# Patient Record
Sex: MALE | Race: BLACK OR AFRICAN AMERICAN | Employment: UNEMPLOYED | URBAN - METROPOLITAN AREA
[De-identification: names, ages, dates, MRNs, and addresses within clinical notes are randomized per-mention and may not be internally consistent; named-entity substitution may affect disease eponyms.]

---

## 2017-09-28 ENCOUNTER — HOSPITAL ENCOUNTER (EMERGENCY)
Age: 57
Discharge: HOME | End: 2017-09-28
Attending: EMERGENCY MEDICINE
Payer: SELF-PAY

## 2017-09-28 ENCOUNTER — APPOINTMENT (OUTPATIENT)
Dept: CT IMAGING | Age: 57
End: 2017-09-28
Attending: EMERGENCY MEDICINE
Payer: SELF-PAY

## 2017-09-28 ENCOUNTER — APPOINTMENT (OUTPATIENT)
Dept: GENERAL RADIOLOGY | Age: 57
End: 2017-09-28
Attending: EMERGENCY MEDICINE
Payer: SELF-PAY

## 2017-09-28 VITALS
SYSTOLIC BLOOD PRESSURE: 185 MMHG | RESPIRATION RATE: 18 BRPM | BODY MASS INDEX: 38.57 KG/M2 | HEIGHT: 66 IN | OXYGEN SATURATION: 94 % | WEIGHT: 240 LBS | TEMPERATURE: 98 F | HEART RATE: 92 BPM | DIASTOLIC BLOOD PRESSURE: 87 MMHG

## 2017-09-28 DIAGNOSIS — S80.02XA CONTUSION OF LEFT KNEE, INITIAL ENCOUNTER: Primary | ICD-10-CM
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
## 2017-09-28 LAB
ALBUMIN SERPL-MCNC: 3.7 G/DL (ref 3.5–5)
ALBUMIN/GLOB SERPL: 0.8 {RATIO} (ref 1.2–3.5)
ALP SERPL-CCNC: 129 U/L (ref 50–136)
ALT SERPL-CCNC: 54 U/L (ref 12–65)
ANION GAP SERPL CALC-SCNC: 7 MMOL/L (ref 7–16)
AST SERPL-CCNC: 28 U/L (ref 15–37)
BASOPHILS # BLD: 0 K/UL (ref 0–0.2)
BASOPHILS NFR BLD: 0 % (ref 0–2)
BILIRUB SERPL-MCNC: 0.4 MG/DL (ref 0.2–1.1)
BUN SERPL-MCNC: 19 MG/DL (ref 6–23)
CALCIUM SERPL-MCNC: 8.7 MG/DL (ref 8.3–10.4)
CHLORIDE SERPL-SCNC: 105 MMOL/L (ref 98–107)
CO2 SERPL-SCNC: 28 MMOL/L (ref 21–32)
CREAT SERPL-MCNC: 1.49 MG/DL (ref 0.8–1.5)
DIFFERENTIAL METHOD BLD: ABNORMAL
EOSINOPHIL # BLD: 0.1 K/UL (ref 0–0.8)
EOSINOPHIL NFR BLD: 1 % (ref 0.5–7.8)
ERYTHROCYTE [DISTWIDTH] IN BLOOD BY AUTOMATED COUNT: 15.2 % (ref 11.9–14.6)
GLOBULIN SER CALC-MCNC: 4.4 G/DL (ref 2.3–3.5)
GLUCOSE SERPL-MCNC: 114 MG/DL (ref 65–100)
HCT VFR BLD AUTO: 43 % (ref 41.1–50.3)
HGB BLD-MCNC: 15.2 G/DL (ref 13.6–17.2)
IMM GRANULOCYTES # BLD: 0 K/UL (ref 0–0.5)
IMM GRANULOCYTES NFR BLD: 0.1 % (ref 0–5)
LYMPHOCYTES # BLD: 2 K/UL (ref 0.5–4.6)
LYMPHOCYTES NFR BLD: 23 % (ref 13–44)
MCH RBC QN AUTO: 28.5 PG (ref 26.1–32.9)
MCHC RBC AUTO-ENTMCNC: 35.3 G/DL (ref 31.4–35)
MCV RBC AUTO: 80.7 FL (ref 79.6–97.8)
MONOCYTES # BLD: 0.7 K/UL (ref 0.1–1.3)
MONOCYTES NFR BLD: 8 % (ref 4–12)
NEUTS SEG # BLD: 6.1 K/UL (ref 1.7–8.2)
NEUTS SEG NFR BLD: 68 % (ref 43–78)
PLATELET # BLD AUTO: 229 K/UL (ref 150–450)
PMV BLD AUTO: 10.2 FL (ref 10.8–14.1)
POTASSIUM SERPL-SCNC: 4.1 MMOL/L (ref 3.5–5.1)
PROT SERPL-MCNC: 8.1 G/DL (ref 6.3–8.2)
RBC # BLD AUTO: 5.33 M/UL (ref 4.23–5.67)
SODIUM SERPL-SCNC: 140 MMOL/L (ref 136–145)
WBC # BLD AUTO: 9 K/UL (ref 4.3–11.1)

## 2017-09-28 PROCEDURE — 80053 COMPREHEN METABOLIC PANEL: CPT | Performed by: EMERGENCY MEDICINE

## 2017-09-28 PROCEDURE — 73562 X-RAY EXAM OF KNEE 3: CPT

## 2017-09-28 PROCEDURE — 99283 EMERGENCY DEPT VISIT LOW MDM: CPT | Performed by: EMERGENCY MEDICINE

## 2017-09-28 PROCEDURE — 85025 COMPLETE CBC W/AUTO DIFF WBC: CPT | Performed by: EMERGENCY MEDICINE

## 2017-09-28 PROCEDURE — 70450 CT HEAD/BRAIN W/O DYE: CPT

## 2017-09-28 RX ORDER — NAPROXEN SODIUM 550 MG/1
550 TABLET ORAL 2 TIMES DAILY WITH MEALS
Qty: 10 TAB | Refills: 0 | Status: SHIPPED | OUTPATIENT
Start: 2017-09-28

## 2017-09-28 RX ORDER — TRAMADOL HYDROCHLORIDE 50 MG/1
50 TABLET ORAL
Qty: 15 TAB | Refills: 0 | Status: SHIPPED | OUTPATIENT
Start: 2017-09-28 | End: 2017-09-28

## 2017-09-28 NOTE — ED NOTES
I have reviewed discharge instructions with the patient. The patient verbalized understanding. Patient left ED via Discharge Method: ambulatory to Home with friend. Opportunity for questions and clarification provided. Patient given 1 scripts.

## 2017-09-28 NOTE — DISCHARGE INSTRUCTIONS

## 2017-09-28 NOTE — ED TRIAGE NOTES
Pt arrives to this facility via EMS r/t a fall that occurred behind [employer] this evening. Pt reports hitting his head, unsure of weather or not he lost consciousness. Pt also reports pain to his left knee. Pt alert and oriented x 4.

## 2017-09-28 NOTE — ED PROVIDER NOTES
HPI Comments: Patient is a 20-year-old male who presents with fall approximately 1.5 hours ago. States he was waiting for [employer] open and he missed a curb. Complaining of pain primarily to his left knee. Also mentions he hit his head. States he didn't hit his head very hard. No loss of consciousness. No palpable hematoma. Patient states that he was at [facility] on 4/28/17 and was admitted for an epidural hematoma after a fall. Patient has discharge paperwork in regards this. Patient is ambulatory though with some pain. Alert and oriented at this time. Patient is a 62 y.o. male presenting with fall. The history is provided by the patient. No  was used. Fall   Pertinent negatives include no fever, no abdominal pain, no nausea, no vomiting and no headaches. Past Medical History:   Diagnosis Date    Hypertension        No past surgical history on file. No family history on file. Social History     Social History    Marital status: SINGLE     Spouse name: N/A    Number of children: N/A    Years of education: N/A     Occupational History    Not on file. Social History Main Topics    Smoking status: Not on file    Smokeless tobacco: Not on file    Alcohol use Not on file    Drug use: Not on file    Sexual activity: Not on file     Other Topics Concern    Not on file     Social History Narrative    No narrative on file         ALLERGIES: Review of patient's allergies indicates no known allergies. Review of Systems   Constitutional: Negative for fatigue and fever. Respiratory: Negative for cough and shortness of breath. Gastrointestinal: Negative for abdominal pain, nausea and vomiting. Genitourinary: Negative for difficulty urinating. Musculoskeletal: Negative for back pain and neck pain. Left knee pain   Skin: Negative for rash. Neurological: Negative for headaches. Psychiatric/Behavioral: Negative for confusion.  The patient is not nervous/anxious. Vitals:    09/28/17 0637   BP: (!) 153/95   Pulse: 95   Resp: 18   Temp: 98 °F (36.7 °C)   SpO2: 95%   Weight: 108.9 kg (240 lb)   Height: 5' 6\" (1.676 m)            Physical Exam   Constitutional: He is oriented to person, place, and time. He appears well-developed and well-nourished. No distress. HENT:   Head: Normocephalic and atraumatic. Eyes: Conjunctivae and EOM are normal. Pupils are equal, round, and reactive to light. Neck: Normal range of motion. Neck supple. Cardiovascular: Normal rate, regular rhythm and normal heart sounds. Pulmonary/Chest: Effort normal and breath sounds normal. No respiratory distress. He has no wheezes. He has no rales. Abdominal: Soft. He exhibits no distension. There is no tenderness. There is no rebound. Musculoskeletal: Normal range of motion. He exhibits tenderness. He exhibits no edema. Patient is point tender in his left lateral tibial plateau region. There is a small abrasion overlying this region. No significant knee effusion appreciated. No laxity appreciated. Neurological: He is alert and oriented to person, place, and time. Skin: Skin is warm and dry. No rash noted. He is not diaphoretic. Psychiatric: He has a normal mood and affect. His behavior is normal.   Vitals reviewed. MDM  Number of Diagnoses or Management Options  Contusion of left knee, initial encounter: new and requires workup  Fall, initial encounter: new and requires workup  Diagnosis management comments: Patient appears quite well and sleeping in bed. X-rays as well as CT scan negative. Labs are reassuring. We'll discharged home and have her follow up with her primary care plan for future.        Amount and/or Complexity of Data Reviewed  Clinical lab tests: ordered and reviewed (Results for orders placed or performed during the hospital encounter of 09/28/17  -CBC WITH AUTOMATED DIFF       Result                                            Value Ref Range                       WBC                                               9.0                           4.3 - 11.1 K/uL                 RBC                                               5.33                          4.23 - 5.67 M/uL                HGB                                               15.2                          13.6 - 17.2 g/dL                HCT                                               43.0                          41.1 - 50.3 %                   MCV                                               80.7                          79.6 - 97.8 FL                  MCH                                               28.5                          26.1 - 32.9 PG                  MCHC                                              35.3 (H)                      31.4 - 35.0 g/dL                RDW                                               15.2 (H)                      11.9 - 14.6 %                   PLATELET                                          229                           150 - 450 K/uL                  MPV                                               10.2 (L)                      10.8 - 14.1 FL                  DF                                                AUTOMATED                                                     NEUTROPHILS                                       68                            43 - 78 %                       LYMPHOCYTES                                       23                            13 - 44 %                       MONOCYTES                                         8                             4.0 - 12.0 %                    EOSINOPHILS                                       1                             0.5 - 7.8 %                     BASOPHILS                                         0                             0.0 - 2.0 %                     IMMATURE GRANULOCYTES                             0.1                           0.0 - 5.0 % ABS. NEUTROPHILS                                  6.1                           1.7 - 8.2 K/UL                  ABS. LYMPHOCYTES                                  2.0                           0.5 - 4.6 K/UL                  ABS. MONOCYTES                                    0.7                           0.1 - 1.3 K/UL                  ABS. EOSINOPHILS                                  0.1                           0.0 - 0.8 K/UL                  ABS. BASOPHILS                                    0.0                           0.0 - 0.2 K/UL                  ABS. IMM.  GRANS.                                  0.0                           0.0 - 0.5 K/UL             -METABOLIC PANEL, COMPREHENSIVE       Result                                            Value                         Ref Range                       Sodium                                            140                           136 - 145 mmol/L                Potassium                                         4.1                           3.5 - 5.1 mmol/L                Chloride                                          105                           98 - 107 mmol/L                 CO2                                               28                            21 - 32 mmol/L                  Anion gap                                         7                             7 - 16 mmol/L                   Glucose                                           114 (H)                       65 - 100 mg/dL                  BUN                                               19                            6 - 23 MG/DL                    Creatinine                                        1.49                          0.8 - 1.5 MG/DL                 GFR est AA                                        >60                           >60 ml/min/1.73m2               GFR est non-AA                                    52 (L)                        >60 ml/min/1.73m2 Calcium                                           8.7                           8.3 - 10.4 MG/DL                Bilirubin, total                                  0.4                           0.2 - 1.1 MG/DL                 ALT (SGPT)                                        54                            12 - 65 U/L                     AST (SGOT)                                        28                            15 - 37 U/L                     Alk. phosphatase                                  129                           50 - 136 U/L                    Protein, total                                    8.1                           6.3 - 8.2 g/dL                  Albumin                                           3.7                           3.5 - 5.0 g/dL                  Globulin                                          4.4 (H)                       2.3 - 3.5 g/dL                  A-G Ratio                                         0.8 (L)                       1.2 - 3.5                  )  Tests in the radiology section of CPT®: ordered and reviewed (Ct Head Wo Cont    Result Date: 9/28/2017  CT HEAD WITHOUT CONTRAST. INDICATION: Head injury sustained in fall. COMPARISON: None. TECHNIQUE:   5 mm axial scans from the skull base to the vertex. Our CT scanners use one or more of the following:  Automated exposure control, adjustment of the mA and or kV according to patient size, iterative reconstruction. FINDINGS:  No acute intraparenchymal hemorrhage or abnormal extra-axial fluid collection. The ventricles are normal size. No midline shift or mass effect. Included portion of the paranasal sinuses and orbits grossly unremarkable. IMPRESSION:  Negative for acute intracranial abnormality. Xr Knee Lt 3 V    Result Date: 9/28/2017  LEFT KNEE 3 views. HISTORY: Acute left knee pain following fall TECHNIQUE: AP and lateral and oblique views. COMPARISON: None.  FINDINGS: No acute fracture, dislocation or subluxation. Mild degenerative change.      IMPRESSION: Negative for acute fracture.     )  Review and summarize past medical records: yes  Independent visualization of images, tracings, or specimens: yes    Risk of Complications, Morbidity, and/or Mortality  Presenting problems: moderate  Diagnostic procedures: moderate  Management options: moderate    Patient Progress  Patient progress: improved    ED Course       Procedures

## 2024-05-08 ENCOUNTER — HOSPITAL ENCOUNTER (EMERGENCY)
Age: 64
Discharge: HOME | End: 2024-05-08
Attending: EMERGENCY MEDICINE
Payer: MEDICARE

## 2024-05-08 VITALS
HEART RATE: 76 BPM | OXYGEN SATURATION: 96 % | BODY MASS INDEX: 48.21 KG/M2 | WEIGHT: 300 LBS | SYSTOLIC BLOOD PRESSURE: 158 MMHG | DIASTOLIC BLOOD PRESSURE: 95 MMHG | TEMPERATURE: 98.5 F | RESPIRATION RATE: 16 BRPM | HEIGHT: 66 IN

## 2024-05-08 DIAGNOSIS — F14.20 COCAINE DEPENDENCE WITHOUT COMPLICATION (HCC): Primary | ICD-10-CM

## 2024-05-08 LAB
ALBUMIN SERPL-MCNC: 3.8 G/DL (ref 3.2–4.6)
ALBUMIN/GLOB SERPL: 1 (ref 1–1.9)
ALP SERPL-CCNC: 116 U/L (ref 40–129)
ALT SERPL-CCNC: 28 U/L (ref 12–65)
AMPHET UR QL SCN: NEGATIVE
ANION GAP SERPL CALC-SCNC: 11 MMOL/L (ref 9–18)
AST SERPL-CCNC: 32 U/L (ref 15–37)
BARBITURATES UR QL SCN: NEGATIVE
BASOPHILS # BLD: 0.1 K/UL (ref 0–0.2)
BASOPHILS NFR BLD: 1 % (ref 0–2)
BENZODIAZ UR QL: NEGATIVE
BILIRUB SERPL-MCNC: 0.3 MG/DL (ref 0–1.2)
BUN SERPL-MCNC: 13 MG/DL (ref 8–23)
CALCIUM SERPL-MCNC: 10 MG/DL (ref 8.8–10.2)
CANNABINOIDS UR QL SCN: NEGATIVE
CHLORIDE SERPL-SCNC: 102 MMOL/L (ref 98–107)
CO2 SERPL-SCNC: 27 MMOL/L (ref 20–28)
COCAINE UR QL SCN: POSITIVE
CREAT SERPL-MCNC: 1.41 MG/DL (ref 0.8–1.3)
DIFFERENTIAL METHOD BLD: ABNORMAL
EOSINOPHIL # BLD: 0.1 K/UL (ref 0–0.8)
EOSINOPHIL NFR BLD: 2 % (ref 0.5–7.8)
ERYTHROCYTE [DISTWIDTH] IN BLOOD BY AUTOMATED COUNT: 15 % (ref 11.9–14.6)
GLOBULIN SER CALC-MCNC: 3.7 G/DL (ref 2.3–3.5)
GLUCOSE SERPL-MCNC: 142 MG/DL (ref 70–99)
HCT VFR BLD AUTO: 44.1 % (ref 41.1–50.3)
HGB BLD-MCNC: 14.9 G/DL (ref 13.6–17.2)
IMM GRANULOCYTES # BLD AUTO: 0 K/UL (ref 0–0.5)
IMM GRANULOCYTES NFR BLD AUTO: 0 % (ref 0–5)
LYMPHOCYTES # BLD: 1.9 K/UL (ref 0.5–4.6)
LYMPHOCYTES NFR BLD: 26 % (ref 13–44)
MCH RBC QN AUTO: 27.9 PG (ref 26.1–32.9)
MCHC RBC AUTO-ENTMCNC: 33.8 G/DL (ref 31.4–35)
MCV RBC AUTO: 82.6 FL (ref 82–102)
METHADONE UR QL: NEGATIVE
MONOCYTES # BLD: 0.7 K/UL (ref 0.1–1.3)
MONOCYTES NFR BLD: 10 % (ref 4–12)
NEUTS SEG # BLD: 4.5 K/UL (ref 1.7–8.2)
NEUTS SEG NFR BLD: 61 % (ref 43–78)
NRBC # BLD: 0 K/UL (ref 0–0.2)
OPIATES UR QL: NEGATIVE
PCP UR QL: NEGATIVE
PLATELET # BLD AUTO: 209 K/UL (ref 150–450)
PMV BLD AUTO: 11.1 FL (ref 9.4–12.3)
POTASSIUM SERPL-SCNC: 4.2 MMOL/L (ref 3.5–5.1)
PROT SERPL-MCNC: 7.4 G/DL (ref 6.3–8.2)
RBC # BLD AUTO: 5.34 M/UL (ref 4.23–5.6)
SODIUM SERPL-SCNC: 139 MMOL/L (ref 136–145)
WBC # BLD AUTO: 7.4 K/UL (ref 4.3–11.1)

## 2024-05-08 PROCEDURE — 6370000000 HC RX 637 (ALT 250 FOR IP): Performed by: EMERGENCY MEDICINE

## 2024-05-08 PROCEDURE — 85025 COMPLETE CBC W/AUTO DIFF WBC: CPT

## 2024-05-08 PROCEDURE — 80307 DRUG TEST PRSMV CHEM ANLYZR: CPT

## 2024-05-08 PROCEDURE — 99283 EMERGENCY DEPT VISIT LOW MDM: CPT

## 2024-05-08 PROCEDURE — 80053 COMPREHEN METABOLIC PANEL: CPT

## 2024-05-08 RX ORDER — ONDANSETRON 4 MG/1
4 TABLET, ORALLY DISINTEGRATING ORAL
Status: COMPLETED | OUTPATIENT
Start: 2024-05-08 | End: 2024-05-08

## 2024-05-08 RX ADMIN — ONDANSETRON 4 MG: 4 TABLET, ORALLY DISINTEGRATING ORAL at 13:37

## 2024-05-08 ASSESSMENT — ENCOUNTER SYMPTOMS
COLOR CHANGE: 0
BACK PAIN: 0
DIARRHEA: 0
COUGH: 0
ABDOMINAL PAIN: 0
RHINORRHEA: 0
VOMITING: 0
NAUSEA: 0
SHORTNESS OF BREATH: 0

## 2024-05-08 ASSESSMENT — LIFESTYLE VARIABLES
HOW OFTEN DO YOU HAVE A DRINK CONTAINING ALCOHOL: 2-4 TIMES A MONTH
HOW MANY STANDARD DRINKS CONTAINING ALCOHOL DO YOU HAVE ON A TYPICAL DAY: 3 OR 4

## 2024-05-08 ASSESSMENT — PAIN - FUNCTIONAL ASSESSMENT: PAIN_FUNCTIONAL_ASSESSMENT: NONE - DENIES PAIN

## 2024-05-08 NOTE — CARE COORDINATION
Patient admits to struggling with FREEMAN. Says he has been using Cocaine for a very long time and he wants to stop. He has been accepted to [facility] in [place] and CM is arranging transportation.

## 2024-05-08 NOTE — DISCHARGE INSTRUCTIONS
Call the number below for primary care referral.  Go to sober living facility.  Good luck and take good care of yourself    We would love to help you get a primary care doctor for follow-up after your emergency department visit.    Please call [phone] between 7AM - 6PM Monday to Friday.  A care navigator will be able to assist you with setting up a doctor close to your home.

## 2024-05-08 NOTE — ED PROVIDER NOTES
Emergency Department Provider Note       PCP: Unknown, Provider, APRN - NP   Age: 63 y.o.   Sex: male     DISPOSITION Decision To Discharge 05/08/2024 02:33:33 PM       ICD-10-CM    1. Cocaine dependence without complication (HCC)  F14.20           Medical Decision Making     Beni has seen the patient as well as case management and patient will be discharged from here to sober living.  Number to establish primary care provided as well.  Return precautions provided.  No acute medical issues today     1 chronic illness with exacerbation.  Shared medical decision making was utilized in creating the patients health plan today.    I independently ordered and reviewed each unique test.           The management of this patient was discussed with an external consultant.  FAVOR          History     63-year-old male presents requesting help with his chronic cocaine dependence.  He has been binging on cocaine in the last 6 days and drinking some alcohol but he states he does not drink on a regular basis.  He denies other drug use.  Patient denies chest pain trouble breathing heart palpitations, suicidal or homicidal ideation patient alert and oriented times          ROS     Review of Systems   Constitutional:  Negative for chills and fever.   HENT:  Negative for congestion and rhinorrhea.    Respiratory:  Negative for cough and shortness of breath.    Cardiovascular:  Negative for chest pain and leg swelling.   Gastrointestinal:  Negative for abdominal pain, diarrhea, nausea and vomiting.   Endocrine: Negative for polydipsia and polyuria.   Genitourinary:  Negative for dysuria, frequency and hematuria.   Musculoskeletal:  Negative for back pain and myalgias.   Skin:  Negative for color change and rash.   Neurological:  Negative for weakness and numbness.   Psychiatric/Behavioral:  Negative for suicidal ideas.    All other systems reviewed and are negative.       Physical Exam     Vitals signs and nursing note  reviewed:  Vitals:    05/08/24 1238 05/08/24 1258 05/08/24 1318 05/08/24 1345   BP: (!) 148/91 132/81 102/81 (!) 158/95   Pulse:   82 76   Resp:   18 16   Temp:       TempSrc:       SpO2: 97% 97% 99% 96%   Weight:       Height:          Physical Exam  Vitals and nursing note reviewed.   Constitutional:       Appearance: Normal appearance.   HENT:      Head: Normocephalic and atraumatic.      Nose: Nose normal.      Mouth/Throat:      Mouth: Mucous membranes are moist.   Eyes:      Conjunctiva/sclera: Conjunctivae normal.      Pupils: Pupils are equal, round, and reactive to light.   Cardiovascular:      Rate and Rhythm: Normal rate and regular rhythm.      Pulses: Normal pulses.      Heart sounds: Normal heart sounds.   Pulmonary:      Effort: Pulmonary effort is normal.      Breath sounds: Normal breath sounds.   Abdominal:      General: There is no distension.      Palpations: Abdomen is soft.      Tenderness: There is no abdominal tenderness. There is no guarding or rebound.   Musculoskeletal:         General: Normal range of motion.   Skin:     General: Skin is warm and dry.   Neurological:      Mental Status: He is alert and oriented to person, place, and time.   Psychiatric:         Behavior: Behavior normal.        Procedures     Procedures    Orders Placed This Encounter   Procedures    Urine Drug Screen    CBC with Auto Differential    Comprehensive Metabolic Panel    Misc nursing order (specify)        Medications given during this emergency department visit:  Medications   ondansetron (ZOFRAN-ODT) disintegrating tablet 4 mg (4 mg Oral Given 5/8/24 1337)       New Prescriptions    No medications on file        No past medical history on file.     No past surgical history on file.     Social History     Socioeconomic History    Marital status: Single        Previous Medications    No medications on file        Results for orders placed or performed during the hospital encounter of 05/08/24   Urine Drug

## 2024-05-08 NOTE — ED TRIAGE NOTES
Pt arrives from outside homeless shelter via EMS w/ cc of assistance w/ housing and rehab. Pt admits to cocaine use this morning. Pt denies SI, HI. VSS Pt A&O x4

## 2024-05-08 NOTE — ED NOTES
Patient mobility status  with no difficulty. Provider aware     I have reviewed discharge instructions with the patient.  The patient verbalized understanding.    Patient left ED via Discharge Method: ambulatory to [facility] with  [facility] .    Opportunity for questions and clarification provided.     Patient given 0 scripts.           Rajat Grimaldo, RN  05/08/24 2726

## 2024-05-08 NOTE — CARE COORDINATION
Patient discharge to [facility] in [place] located at [address] [place] [address].  Patient is provided transportation to [facility].

## 2024-07-08 ENCOUNTER — OFFICE VISIT (OUTPATIENT)
Dept: FAMILY MEDICINE CLINIC | Age: 64
End: 2024-07-08

## 2024-07-08 VITALS
BODY MASS INDEX: 37.2 KG/M2 | TEMPERATURE: 97 F | DIASTOLIC BLOOD PRESSURE: 89 MMHG | WEIGHT: 231.48 LBS | OXYGEN SATURATION: 98 % | HEIGHT: 66 IN | SYSTOLIC BLOOD PRESSURE: 139 MMHG | RESPIRATION RATE: 17 BRPM | HEART RATE: 89 BPM

## 2024-07-08 DIAGNOSIS — G89.29 CHRONIC LEFT SHOULDER PAIN: Primary | ICD-10-CM

## 2024-07-08 DIAGNOSIS — J45.20 MILD INTERMITTENT ASTHMA WITHOUT COMPLICATION: ICD-10-CM

## 2024-07-08 DIAGNOSIS — Z59.01 SHELTERED HOMELESSNESS: ICD-10-CM

## 2024-07-08 DIAGNOSIS — M25.512 CHRONIC LEFT SHOULDER PAIN: Primary | ICD-10-CM

## 2024-07-08 PROBLEM — R39.12 BENIGN PROSTATIC HYPERPLASIA WITH WEAK URINARY STREAM: Status: ACTIVE | Noted: 2020-11-10

## 2024-07-08 PROBLEM — E55.9 VITAMIN D DEFICIENCY: Status: ACTIVE | Noted: 2020-07-16

## 2024-07-08 PROBLEM — J93.9 PNEUMOTHORAX, RIGHT: Status: ACTIVE | Noted: 2017-03-05

## 2024-07-08 PROBLEM — R29.90 STROKE-LIKE SYMPTOMS: Status: ACTIVE | Noted: 2019-11-24

## 2024-07-08 PROBLEM — M1A.9XX0 CHRONIC GOUT INVOLVING TOE OF LEFT FOOT WITHOUT TOPHUS: Status: ACTIVE | Noted: 2020-07-16

## 2024-07-08 PROBLEM — S22.41XA CLOSED FRACTURE OF MULTIPLE RIBS OF RIGHT SIDE: Status: ACTIVE | Noted: 2017-03-05

## 2024-07-08 PROBLEM — E87.5 HYPERKALEMIA: Status: ACTIVE | Noted: 2022-05-05

## 2024-07-08 PROBLEM — R06.83 SNORING: Status: ACTIVE | Noted: 2020-07-20

## 2024-07-08 PROBLEM — M17.32 POST-TRAUMATIC OSTEOARTHRITIS OF LEFT KNEE: Status: ACTIVE | Noted: 2024-07-08

## 2024-07-08 PROBLEM — B35.1 ONYCHOMYCOSIS: Status: ACTIVE | Noted: 2020-08-06

## 2024-07-08 PROBLEM — N17.9 AKI (ACUTE KIDNEY INJURY) (HCC): Status: ACTIVE | Noted: 2022-05-05

## 2024-07-08 PROBLEM — F32.9 REACTIVE DEPRESSION: Status: ACTIVE | Noted: 2020-08-06

## 2024-07-08 PROBLEM — M79.641 RIGHT HAND PAIN: Status: ACTIVE | Noted: 2017-03-06

## 2024-07-08 PROBLEM — A41.9 SEPSIS (HCC): Status: ACTIVE | Noted: 2022-05-05

## 2024-07-08 PROBLEM — M23.204 OLD TEAR OF MEDIAL MENISCUS OF LEFT KNEE: Status: ACTIVE | Noted: 2020-07-16

## 2024-07-08 PROBLEM — N40.1 BENIGN PROSTATIC HYPERPLASIA WITH WEAK URINARY STREAM: Status: ACTIVE | Noted: 2020-11-10

## 2024-07-08 PROBLEM — J18.9 PNEUMONIA DUE TO INFECTIOUS ORGANISM: Status: ACTIVE | Noted: 2022-05-05

## 2024-07-08 PROBLEM — G40.909 SEIZURE DISORDER (HCC): Status: ACTIVE | Noted: 2020-07-16

## 2024-07-08 PROBLEM — E78.2 MIXED HYPERLIPIDEMIA: Status: ACTIVE | Noted: 2020-07-20

## 2024-07-08 PROBLEM — I15.9 SECONDARY HYPERTENSION: Status: ACTIVE | Noted: 2017-03-07

## 2024-07-08 PROBLEM — S06.4XAA EPIDURAL HEMATOMA (HCC): Status: ACTIVE | Noted: 2017-03-05

## 2024-07-08 PROBLEM — F17.210 CIGARETTE NICOTINE DEPENDENCE WITHOUT COMPLICATION: Status: ACTIVE | Noted: 2019-11-24

## 2024-07-08 PROBLEM — S02.30XA ORBITAL FLOOR FRACTURE (HCC): Status: ACTIVE | Noted: 2017-03-05

## 2024-07-08 PROCEDURE — G8417 CALC BMI ABV UP PARAM F/U: HCPCS | Performed by: NURSE PRACTITIONER

## 2024-07-08 PROCEDURE — 3017F COLORECTAL CA SCREEN DOC REV: CPT | Performed by: NURSE PRACTITIONER

## 2024-07-08 PROCEDURE — G8428 CUR MEDS NOT DOCUMENT: HCPCS | Performed by: NURSE PRACTITIONER

## 2024-07-08 PROCEDURE — 4004F PT TOBACCO SCREEN RCVD TLK: CPT | Performed by: NURSE PRACTITIONER

## 2024-07-08 PROCEDURE — 99203 OFFICE O/P NEW LOW 30 MIN: CPT | Performed by: NURSE PRACTITIONER

## 2024-07-08 RX ORDER — ALBUTEROL SULFATE 90 UG/1
2 AEROSOL, METERED RESPIRATORY (INHALATION) EVERY 4 HOURS PRN
COMMUNITY
Start: 2021-05-26 | End: 2024-07-08 | Stop reason: SDUPTHER

## 2024-07-08 RX ORDER — ALBUTEROL SULFATE 90 UG/1
2 AEROSOL, METERED RESPIRATORY (INHALATION) EVERY 4 HOURS PRN
Qty: 18 G | Refills: 0 | Status: SHIPPED | OUTPATIENT
Start: 2024-07-08

## 2024-07-08 RX ORDER — KETOROLAC TROMETHAMINE 30 MG/ML
30 INJECTION, SOLUTION INTRAMUSCULAR; INTRAVENOUS ONCE
Qty: 2 ML | Refills: 0
Start: 2024-07-08 | End: 2024-07-08

## 2024-07-08 RX ORDER — DEXAMETHASONE SODIUM PHOSPHATE 4 MG/ML
4 INJECTION, SOLUTION INTRA-ARTICULAR; INTRALESIONAL; INTRAMUSCULAR; INTRAVENOUS; SOFT TISSUE ONCE
Qty: 1 ML | Refills: 0
Start: 2024-07-08 | End: 2024-07-08

## 2024-07-08 SDOH — ECONOMIC STABILITY - HOUSING INSECURITY: SHELTERED HOMELESSNESS: Z59.01

## 2024-07-08 ASSESSMENT — ENCOUNTER SYMPTOMS
CHEST TIGHTNESS: 0
SHORTNESS OF BREATH: 0
WHEEZING: 0

## 2024-07-08 ASSESSMENT — LIFESTYLE VARIABLES
HOW MANY STANDARD DRINKS CONTAINING ALCOHOL DO YOU HAVE ON A TYPICAL DAY: PATIENT DOES NOT DRINK
HOW OFTEN DO YOU HAVE A DRINK CONTAINING ALCOHOL: NEVER

## 2024-07-08 NOTE — PROGRESS NOTES
No current facility-administered medications for this visit.       No past medical history on file.    No past surgical history on file.    Review of Systems   Constitutional:  Negative for fatigue.   HENT:  Negative for congestion.    Respiratory:  Negative for chest tightness, shortness of breath and wheezing.    Cardiovascular:  Negative for chest pain and leg swelling.   Musculoskeletal:  Positive for arthralgias and myalgias.       Physical Exam  Vitals reviewed.   Constitutional:       General: He is in acute distress.      Appearance: He is obese. He is not ill-appearing, toxic-appearing or diaphoretic.   HENT:      Head: Normocephalic and atraumatic.      Right Ear: Tympanic membrane, ear canal and external ear normal. There is no impacted cerumen.      Left Ear: Tympanic membrane, ear canal and external ear normal. There is no impacted cerumen.      Nose: Nose normal. No congestion or rhinorrhea.      Mouth/Throat:      Mouth: Mucous membranes are moist.      Pharynx: Oropharynx is clear. No oropharyngeal exudate or posterior oropharyngeal erythema.   Eyes:      General: No scleral icterus.        Right eye: No discharge.         Left eye: No discharge.      Extraocular Movements: Extraocular movements intact.      Conjunctiva/sclera: Conjunctivae normal.      Pupils: Pupils are equal, round, and reactive to light.   Neck:      Vascular: No carotid bruit.   Cardiovascular:      Rate and Rhythm: Normal rate and regular rhythm.      Pulses: Normal pulses.      Heart sounds: Normal heart sounds. No murmur heard.     No friction rub. No gallop.   Pulmonary:      Effort: Pulmonary effort is normal. No respiratory distress.      Breath sounds: Normal breath sounds. No stridor. No wheezing, rhonchi or rales.   Chest:      Chest wall: No tenderness.   Abdominal:      General: Bowel sounds are normal. There is no distension.      Palpations: Abdomen is soft. There is no mass.      Tenderness: There is no

## 2024-07-15 ENCOUNTER — OFFICE VISIT (OUTPATIENT)
Dept: FAMILY MEDICINE CLINIC | Age: 64
End: 2024-07-15

## 2024-07-15 VITALS
HEIGHT: 66 IN | BODY MASS INDEX: 37.98 KG/M2 | HEART RATE: 73 BPM | SYSTOLIC BLOOD PRESSURE: 145 MMHG | DIASTOLIC BLOOD PRESSURE: 89 MMHG | WEIGHT: 236.33 LBS | RESPIRATION RATE: 15 BRPM | OXYGEN SATURATION: 97 % | TEMPERATURE: 97.2 F

## 2024-07-15 DIAGNOSIS — G89.29 CHRONIC LEFT SHOULDER PAIN: Primary | ICD-10-CM

## 2024-07-15 DIAGNOSIS — M25.512 CHRONIC LEFT SHOULDER PAIN: Primary | ICD-10-CM

## 2024-07-15 DIAGNOSIS — Z59.01 SHELTERED HOMELESSNESS: ICD-10-CM

## 2024-07-15 PROCEDURE — 3017F COLORECTAL CA SCREEN DOC REV: CPT | Performed by: NURSE PRACTITIONER

## 2024-07-15 PROCEDURE — G8417 CALC BMI ABV UP PARAM F/U: HCPCS | Performed by: NURSE PRACTITIONER

## 2024-07-15 PROCEDURE — G8427 DOCREV CUR MEDS BY ELIG CLIN: HCPCS | Performed by: NURSE PRACTITIONER

## 2024-07-15 PROCEDURE — 4004F PT TOBACCO SCREEN RCVD TLK: CPT | Performed by: NURSE PRACTITIONER

## 2024-07-15 PROCEDURE — 99214 OFFICE O/P EST MOD 30 MIN: CPT | Performed by: NURSE PRACTITIONER

## 2024-07-15 RX ORDER — KETOROLAC TROMETHAMINE 30 MG/ML
30 INJECTION, SOLUTION INTRAMUSCULAR; INTRAVENOUS ONCE
Qty: 2 ML | Refills: 0
Start: 2024-07-15 | End: 2024-07-15

## 2024-07-15 RX ORDER — GABAPENTIN 300 MG/1
300 CAPSULE ORAL 2 TIMES DAILY
Qty: 60 CAPSULE | Refills: 0 | Status: SHIPPED | OUTPATIENT
Start: 2024-07-15 | End: 2024-08-14

## 2024-07-15 SDOH — ECONOMIC STABILITY - HOUSING INSECURITY: SHELTERED HOMELESSNESS: Z59.01

## 2024-07-15 ASSESSMENT — ENCOUNTER SYMPTOMS
SHORTNESS OF BREATH: 0
CHEST TIGHTNESS: 0
BACK PAIN: 0

## 2024-07-15 NOTE — PROGRESS NOTES
Memo Mason (:  1960) is a 64 y.o. male,Established patient, here for evaluation of the following chief complaint(s):  Shoulder Pain (Patient had procedure done on the left shoulder almost 2-3 weeks ago, still having pain of 10/10 he said.)        SUBJECTIVE/OBJECTIVE:    HPI:  The 64 y.o male patient seen in [facility] at [address] [place] [address] for chronic shoulder pain. Patient was seen by me last week for the same health problem. At that time Ketorolac 60 mg IM was given for pain scale of 10/10, steroid given at the same time.  Today's visit patient has left shoulder pain of 10/10.     We discuss pain management and offered to refer patient to pain management clinic patient declined. Patient is aware that [facility] do not order controlled pain medication.    Repeated the Ketorolac 60 mg IM today. Then order Gabapentin 300 mg twice daily for 30 days. If this treatment brings improvement it will follow with refill as needed.    Other pain management options dicussed with patient and he declined.    Patient also arrived with blood pressure of 168/96. Rechecked 145/89.  Patient declined blood medication. Patient is aware uncontrolled blood pressure could cause damage to organs even stroke or death.    We discuss daily walking 20-30 minutes daily as tolerated.    Patient denies use of substance control.    Recommend to patient to come in for wellness visit at the next visit.             BP (!) 145/89   Pulse 73   Temp 97.2 °F (36.2 °C) (Temporal)   Resp 15   Ht 1.68 m (5' 6.14\")   Wt 107.2 kg (236 lb 5.3 oz)   SpO2 97%   BMI 37.98 kg/m²     No results found for: \"CBC\", \"CMP\", \"LIPIDPAN\", \"TSH\", \"HBA1C\"     No Known Allergies    Current Outpatient Medications   Medication Sig Dispense Refill    ketorolac (TORADOL) 60 MG/2ML injection Inject 1 mL into the muscle once for 1 dose 2 mL 0    gabapentin (NEURONTIN) 300 MG capsule Take 1 capsule by mouth 2 times daily for 30 days. 60 capsule 0

## 2024-08-20 ENCOUNTER — HOSPITAL ENCOUNTER (EMERGENCY)
Age: 64
Discharge: HOME | End: 2024-08-20
Payer: MEDICARE

## 2024-08-20 ENCOUNTER — APPOINTMENT (OUTPATIENT)
Dept: GENERAL RADIOLOGY | Age: 64
End: 2024-08-20
Payer: MEDICARE

## 2024-08-20 VITALS
HEART RATE: 90 BPM | RESPIRATION RATE: 16 BRPM | WEIGHT: 234 LBS | TEMPERATURE: 97.6 F | OXYGEN SATURATION: 98 % | BODY MASS INDEX: 37.61 KG/M2 | HEIGHT: 66 IN | DIASTOLIC BLOOD PRESSURE: 94 MMHG | SYSTOLIC BLOOD PRESSURE: 154 MMHG

## 2024-08-20 DIAGNOSIS — M25.512 CHRONIC LEFT SHOULDER PAIN: Primary | ICD-10-CM

## 2024-08-20 DIAGNOSIS — M25.561 CHRONIC PAIN OF RIGHT KNEE: ICD-10-CM

## 2024-08-20 DIAGNOSIS — G89.29 CHRONIC LEFT SHOULDER PAIN: Primary | ICD-10-CM

## 2024-08-20 DIAGNOSIS — G89.29 CHRONIC PAIN OF RIGHT KNEE: ICD-10-CM

## 2024-08-20 PROCEDURE — 96372 THER/PROPH/DIAG INJ SC/IM: CPT

## 2024-08-20 PROCEDURE — 73562 X-RAY EXAM OF KNEE 3: CPT

## 2024-08-20 PROCEDURE — 99284 EMERGENCY DEPT VISIT MOD MDM: CPT

## 2024-08-20 PROCEDURE — 6370000000 HC RX 637 (ALT 250 FOR IP): Performed by: PHYSICIAN ASSISTANT

## 2024-08-20 PROCEDURE — 73030 X-RAY EXAM OF SHOULDER: CPT

## 2024-08-20 PROCEDURE — 6360000002 HC RX W HCPCS: Performed by: PHYSICIAN ASSISTANT

## 2024-08-20 RX ORDER — METHOCARBAMOL 500 MG/1
1000 TABLET, FILM COATED ORAL
Status: COMPLETED | OUTPATIENT
Start: 2024-08-20 | End: 2024-08-20

## 2024-08-20 RX ORDER — METHOCARBAMOL 750 MG/1
750 TABLET, FILM COATED ORAL 3 TIMES DAILY
Qty: 30 TABLET | Refills: 0 | Status: SHIPPED | OUTPATIENT
Start: 2024-08-20 | End: 2024-08-30

## 2024-08-20 RX ORDER — DEXAMETHASONE SODIUM PHOSPHATE 10 MG/ML
10 INJECTION INTRAMUSCULAR; INTRAVENOUS
Status: COMPLETED | OUTPATIENT
Start: 2024-08-20 | End: 2024-08-20

## 2024-08-20 RX ADMIN — METHOCARBAMOL TABLETS 1000 MG: 500 TABLET, COATED ORAL at 15:20

## 2024-08-20 RX ADMIN — DEXAMETHASONE SODIUM PHOSPHATE 10 MG: 10 INJECTION INTRAMUSCULAR; INTRAVENOUS at 15:20

## 2024-08-20 ASSESSMENT — ENCOUNTER SYMPTOMS
GASTROINTESTINAL NEGATIVE: 1
RESPIRATORY NEGATIVE: 1

## 2024-08-20 ASSESSMENT — PAIN DESCRIPTION - DESCRIPTORS: DESCRIPTORS: ACHING

## 2024-08-20 ASSESSMENT — PAIN - FUNCTIONAL ASSESSMENT: PAIN_FUNCTIONAL_ASSESSMENT: 0-10

## 2024-08-20 ASSESSMENT — PAIN DESCRIPTION - LOCATION: LOCATION: KNEE;SHOULDER

## 2024-08-20 NOTE — ED NOTES
I have reviewed discharge instructions with the patient and spouse.  The patient verbalized understanding.    Patient left ED via Discharge Method: ambulatory to Home with self.    Opportunity for questions and clarification provided.       Patient given 0 scripts.         To continue your aftercare when you leave the hospital, you may receive an automated call from our care team to check in on how you are doing.  This is a free service and part of our promise to provide the best care and service to meet your aftercare needs.” If you have questions, or wish to unsubscribe from this service please call [phone].  Thank you for Choosing our [facility] Emergency Department.        Kelsi Quinteros LPN  08/20/24 1855

## 2024-08-20 NOTE — ED PROVIDER NOTES
Emergency Department Provider Note       PCP: Unknown, Provider, APRN - NP   Age: 64 y.o.   Sex: male     DISPOSITION Decision To Discharge 08/20/2024 03:12:42 PM  Condition at Disposition: Stable       ICD-10-CM    1. Chronic left shoulder pain  M25.512 [facility]    G89.29       2. Chronic pain of right knee  M25.561 [facility]    G89.29           Medical Decision Making     Patient presents with acute exacerbation of chronic left shoulder and right knee pain.  X-rays negative.  I referred him to Ortho.  Will place on Voltaren gel and muscle relaxers.  He is to follow-up with specialist and return if worsening in any way.     1 or more acute illnesses that pose a threat to life or bodily function.   Prescription drug management performed.  Patient was discharged risks and benefits of hospitalization were considered.  Shared medical decision making was utilized in creating the patients health plan today.    I independently ordered and reviewed each unique test.                     History     64-year-old male presents with right knee and left shoulder pain.  He says he has had this pain off and on for many years.  He fell over 10 years ago while working at the [employer], and since then has had pain flareup from time to time.  He has never seen an orthopedist.  Says he does not like to go to doctors.  No acute injury.        ROS     Review of Systems   Constitutional: Negative.    HENT: Negative.     Respiratory: Negative.     Cardiovascular: Negative.    Gastrointestinal: Negative.    Genitourinary: Negative.    Musculoskeletal:  Positive for arthralgias.   All other systems reviewed and are negative.       Physical Exam     Vitals signs and nursing note reviewed:  Vitals:    08/20/24 1334   BP: (!) 154/94   Pulse: 90   Resp: 16   Temp: 97.6 °F (36.4 °C)   TempSrc: Oral   SpO2: 98%   Weight: 106.1 kg (234 lb)   Height: 1.676 m (5' 6\")      Physical

## 2024-08-20 NOTE — ED TRIAGE NOTES
Pt presents ambulatory to triage in no apparent distress with concerns for left sided shoulder and right sided knee pain following a fall some months ago. Pt sts he has been having issues for upwards of 2 months but hasn't been able to be seen.

## 2024-12-11 ENCOUNTER — APPOINTMENT (OUTPATIENT)
Dept: GENERAL RADIOLOGY | Age: 64
End: 2024-12-11
Payer: MEDICARE

## 2024-12-11 ENCOUNTER — HOSPITAL ENCOUNTER (EMERGENCY)
Age: 64
Discharge: HOME | End: 2024-12-11
Attending: EMERGENCY MEDICINE
Payer: MEDICARE

## 2024-12-11 VITALS
BODY MASS INDEX: 38.57 KG/M2 | OXYGEN SATURATION: 97 % | HEART RATE: 87 BPM | WEIGHT: 240 LBS | RESPIRATION RATE: 18 BRPM | HEIGHT: 66 IN | DIASTOLIC BLOOD PRESSURE: 85 MMHG | SYSTOLIC BLOOD PRESSURE: 155 MMHG | TEMPERATURE: 98 F

## 2024-12-11 DIAGNOSIS — J45.20 MILD INTERMITTENT CHRONIC ASTHMA WITHOUT COMPLICATION: Primary | ICD-10-CM

## 2024-12-11 PROCEDURE — 94640 AIRWAY INHALATION TREATMENT: CPT

## 2024-12-11 PROCEDURE — 6370000000 HC RX 637 (ALT 250 FOR IP): Performed by: EMERGENCY MEDICINE

## 2024-12-11 PROCEDURE — 99283 EMERGENCY DEPT VISIT LOW MDM: CPT

## 2024-12-11 PROCEDURE — 71046 X-RAY EXAM CHEST 2 VIEWS: CPT

## 2024-12-11 RX ORDER — IPRATROPIUM BROMIDE AND ALBUTEROL SULFATE 2.5; .5 MG/3ML; MG/3ML
1 SOLUTION RESPIRATORY (INHALATION)
Status: COMPLETED | OUTPATIENT
Start: 2024-12-11 | End: 2024-12-11

## 2024-12-11 RX ORDER — ALBUTEROL SULFATE 90 UG/1
2 INHALANT RESPIRATORY (INHALATION) 4 TIMES DAILY PRN
Qty: 8.5 DEVICE | Refills: 2 | Status: SHIPPED | OUTPATIENT
Start: 2024-12-11 | End: 2025-03-11

## 2024-12-11 RX ORDER — PREDNISONE 20 MG/1
50 TABLET ORAL
Status: COMPLETED | OUTPATIENT
Start: 2024-12-11 | End: 2024-12-11

## 2024-12-11 RX ORDER — PREDNISONE 50 MG/1
50 TABLET ORAL DAILY
Qty: 4 TABLET | Refills: 0 | Status: SHIPPED | OUTPATIENT
Start: 2024-12-11 | End: 2024-12-15

## 2024-12-11 RX ADMIN — PREDNISONE 50 MG: 20 TABLET ORAL at 21:42

## 2024-12-11 RX ADMIN — IPRATROPIUM BROMIDE AND ALBUTEROL SULFATE 1 DOSE: 2.5; .5 SOLUTION RESPIRATORY (INHALATION) at 21:29

## 2024-12-11 ASSESSMENT — LIFESTYLE VARIABLES
HOW OFTEN DO YOU HAVE A DRINK CONTAINING ALCOHOL: MONTHLY OR LESS
HOW MANY STANDARD DRINKS CONTAINING ALCOHOL DO YOU HAVE ON A TYPICAL DAY: 3 OR 4

## 2024-12-11 ASSESSMENT — PAIN SCALES - GENERAL: PAINLEVEL_OUTOF10: 6

## 2024-12-11 ASSESSMENT — PAIN - FUNCTIONAL ASSESSMENT: PAIN_FUNCTIONAL_ASSESSMENT: 0-10

## 2024-12-12 NOTE — ED TRIAGE NOTES
Arrived via EMS from the [facility] parking lot. Complains of SOB and generalized weakness. Couldn't get his inhaler,he is out.

## 2024-12-12 NOTE — ED PROVIDER NOTES
[facility], [facility]    [place] [employer] - Occupational Stress Questionnaire     Feeling of Stress : Only a little   Social Connections: Moderately Isolated (5/26/2021)    Received from [facility], [facility]    Social Connection and Isolation Panel [NHANES]     Frequency of Communication with Friends and Family: More than three times a week     Frequency of Social Gatherings with Friends and Family: Once a week     Attends Christianity Services: More than 4 times per year     Active Member of Clubs or Organizations: No     Attends Club or Organization Meetings: Never     Marital Status: Never    Intimate Partner Violence: Not At Risk (5/26/2021)    Received from [facility], [facility]    Humiliation, Afraid, Rape, and Kick questionnaire     Fear of Current or Ex-Partner: No     Emotionally Abused: No     Physically Abused: No     Sexually Abused: No   Housing Stability: High Risk (5/26/2021)    Received from [facility], [facility]    Housing Stability Vital Sign     Unable to Pay for Housing in the Last Year: Yes     Unstable Housing in the Last Year: Yes         Patient has no known allergies.     Previous Medications    DICLOFENAC SODIUM (VOLTAREN) 1 % GEL    Apply 4 g topically 4 times daily    GABAPENTIN (NEURONTIN) 300 MG CAPSULE    Take 1 capsule by mouth 2 times daily for 30 days.        Vitals signs and nursing note reviewed.   Patient Vitals for the past 4 hrs:   Temp Pulse Resp BP SpO2   12/11/24 2158 -- -- -- -- 97 %   12/11/24 2143 -- -- -- (!) 145/86 97 %   12/11/24 2130 -- -- -- -- 99 %   12/11/24 2051 97.6 °F (36.4 °C) 87 18 (!) 149/85 96 %          Physical Exam  Constitutional:       Appearance: Normal appearance.   HENT:      Mouth/Throat:      Mouth: Mucous membranes

## 2025-02-15 ENCOUNTER — HOSPITAL ENCOUNTER (EMERGENCY)
Age: 65
Discharge: HOME | End: 2025-02-16
Attending: EMERGENCY MEDICINE
Payer: MEDICARE

## 2025-02-15 DIAGNOSIS — N39.0 URINARY TRACT INFECTION WITHOUT HEMATURIA, SITE UNSPECIFIED: ICD-10-CM

## 2025-02-15 DIAGNOSIS — F14.929 COCAINE INTOXICATION WITH COMPLICATION (HCC): ICD-10-CM

## 2025-02-15 DIAGNOSIS — R45.850 HOMICIDAL IDEATIONS: Primary | ICD-10-CM

## 2025-02-15 LAB
ALBUMIN SERPL-MCNC: 3.4 G/DL (ref 3.2–4.6)
ALBUMIN/GLOB SERPL: 0.9 (ref 1–1.9)
ALP SERPL-CCNC: 113 U/L (ref 40–129)
ALT SERPL-CCNC: 38 U/L (ref 8–55)
AMPHET UR QL SCN: NEGATIVE
ANION GAP SERPL CALC-SCNC: 15 MMOL/L (ref 7–16)
APAP SERPL-MCNC: <5 UG/ML (ref 10–30)
APPEARANCE UR: CLEAR
AST SERPL-CCNC: 36 U/L (ref 15–37)
BACTERIA URNS QL MICRO: NEGATIVE /HPF
BARBITURATES UR QL SCN: NEGATIVE
BASOPHILS # BLD: 0.07 K/UL (ref 0–0.2)
BASOPHILS NFR BLD: 0.9 % (ref 0–2)
BENZODIAZ UR QL: NEGATIVE
BILIRUB SERPL-MCNC: <0.2 MG/DL (ref 0–1.2)
BILIRUB UR QL: NEGATIVE
BUN SERPL-MCNC: 26 MG/DL (ref 8–23)
CALCIUM SERPL-MCNC: 9.3 MG/DL (ref 8.8–10.2)
CANNABINOIDS UR QL SCN: NEGATIVE
CHLORIDE SERPL-SCNC: 103 MMOL/L (ref 98–107)
CO2 SERPL-SCNC: 23 MMOL/L (ref 20–29)
COCAINE UR QL SCN: POSITIVE
COLOR UR: ABNORMAL
CREAT SERPL-MCNC: 1.65 MG/DL (ref 0.8–1.3)
DIFFERENTIAL METHOD BLD: ABNORMAL
EOSINOPHIL # BLD: 0.13 K/UL (ref 0–0.8)
EOSINOPHIL NFR BLD: 1.6 % (ref 0.5–7.8)
EPI CELLS #/AREA URNS HPF: ABNORMAL /HPF
ERYTHROCYTE [DISTWIDTH] IN BLOOD BY AUTOMATED COUNT: 15.5 % (ref 11.9–14.6)
ETHANOL SERPL-MCNC: 98 MG/DL (ref 0–0.08)
GLOBULIN SER CALC-MCNC: 4 G/DL (ref 2.3–3.5)
GLUCOSE SERPL-MCNC: 96 MG/DL (ref 70–99)
GLUCOSE UR STRIP.AUTO-MCNC: NEGATIVE MG/DL
HCT VFR BLD AUTO: 42.1 % (ref 41.1–50.3)
HGB BLD-MCNC: 14.2 G/DL (ref 13.6–17.2)
HGB UR QL STRIP: NEGATIVE
HYALINE CASTS URNS QL MICRO: ABNORMAL /LPF
IMM GRANULOCYTES # BLD AUTO: 0.02 K/UL (ref 0–0.5)
IMM GRANULOCYTES NFR BLD AUTO: 0.3 % (ref 0–5)
KETONES UR QL STRIP.AUTO: NEGATIVE MG/DL
LEUKOCYTE ESTERASE UR QL STRIP.AUTO: ABNORMAL
LYMPHOCYTES # BLD: 2.8 K/UL (ref 0.5–4.6)
LYMPHOCYTES NFR BLD: 35.2 % (ref 13–44)
MCH RBC QN AUTO: 27.3 PG (ref 26.1–32.9)
MCHC RBC AUTO-ENTMCNC: 33.7 G/DL (ref 31.4–35)
MCV RBC AUTO: 80.8 FL (ref 82–102)
METHADONE UR QL: NEGATIVE
MONOCYTES # BLD: 0.55 K/UL (ref 0.1–1.3)
MONOCYTES NFR BLD: 6.9 % (ref 4–12)
NEUTS SEG # BLD: 4.39 K/UL (ref 1.7–8.2)
NEUTS SEG NFR BLD: 55.1 % (ref 43–78)
NITRITE UR QL STRIP.AUTO: NEGATIVE
NRBC # BLD: 0 K/UL (ref 0–0.2)
OPIATES UR QL: NEGATIVE
PCP UR QL: NEGATIVE
PH UR STRIP: 5.5 (ref 5–9)
PLATELET # BLD AUTO: 232 K/UL (ref 150–450)
PMV BLD AUTO: 10.5 FL (ref 9.4–12.3)
POTASSIUM SERPL-SCNC: 3.7 MMOL/L (ref 3.5–5.1)
PROT SERPL-MCNC: 7.4 G/DL (ref 6.3–8.2)
PROT UR STRIP-MCNC: NEGATIVE MG/DL
RBC # BLD AUTO: 5.21 M/UL (ref 4.23–5.6)
RBC #/AREA URNS HPF: ABNORMAL /HPF
SALICYLATES SERPL-MCNC: <0.5 MG/DL
SODIUM SERPL-SCNC: 140 MMOL/L (ref 136–145)
SP GR UR REFRACTOMETRY: 1.01 (ref 1–1.02)
UROBILINOGEN UR QL STRIP.AUTO: 0.2 EU/DL (ref 0.2–1)
WBC # BLD AUTO: 8 K/UL (ref 4.3–11.1)
WBC URNS QL MICRO: ABNORMAL /HPF

## 2025-02-15 PROCEDURE — 99284 EMERGENCY DEPT VISIT MOD MDM: CPT

## 2025-02-15 PROCEDURE — 80179 DRUG ASSAY SALICYLATE: CPT

## 2025-02-15 PROCEDURE — 80053 COMPREHEN METABOLIC PANEL: CPT

## 2025-02-15 PROCEDURE — 81001 URINALYSIS AUTO W/SCOPE: CPT

## 2025-02-15 PROCEDURE — 80143 DRUG ASSAY ACETAMINOPHEN: CPT

## 2025-02-15 PROCEDURE — 6370000000 HC RX 637 (ALT 250 FOR IP): Performed by: EMERGENCY MEDICINE

## 2025-02-15 PROCEDURE — 85025 COMPLETE CBC W/AUTO DIFF WBC: CPT

## 2025-02-15 PROCEDURE — 82077 ASSAY SPEC XCP UR&BREATH IA: CPT

## 2025-02-15 PROCEDURE — 80307 DRUG TEST PRSMV CHEM ANLYZR: CPT

## 2025-02-15 RX ORDER — ONDANSETRON 4 MG/1
4 TABLET, ORALLY DISINTEGRATING ORAL
Status: COMPLETED | OUTPATIENT
Start: 2025-02-15 | End: 2025-02-15

## 2025-02-15 RX ORDER — CEPHALEXIN 500 MG/1
500 CAPSULE ORAL EVERY 6 HOURS SCHEDULED
Status: DISCONTINUED | OUTPATIENT
Start: 2025-02-16 | End: 2025-02-16 | Stop reason: HOSPADM

## 2025-02-15 RX ADMIN — ONDANSETRON 4 MG: 4 TABLET, ORALLY DISINTEGRATING ORAL at 20:50

## 2025-02-15 ASSESSMENT — PAIN - FUNCTIONAL ASSESSMENT: PAIN_FUNCTIONAL_ASSESSMENT: NONE - DENIES PAIN

## 2025-02-16 VITALS
DIASTOLIC BLOOD PRESSURE: 90 MMHG | SYSTOLIC BLOOD PRESSURE: 127 MMHG | TEMPERATURE: 97.9 F | WEIGHT: 250 LBS | HEIGHT: 66 IN | BODY MASS INDEX: 40.18 KG/M2 | OXYGEN SATURATION: 95 % | HEART RATE: 88 BPM | RESPIRATION RATE: 16 BRPM

## 2025-02-16 PROCEDURE — 6370000000 HC RX 637 (ALT 250 FOR IP): Performed by: EMERGENCY MEDICINE

## 2025-02-16 RX ORDER — CEPHALEXIN 500 MG/1
500 CAPSULE ORAL 2 TIMES DAILY
Qty: 14 CAPSULE | Refills: 0 | Status: SHIPPED | OUTPATIENT
Start: 2025-02-16 | End: 2025-02-23

## 2025-02-16 RX ADMIN — CEPHALEXIN 500 MG: 500 CAPSULE ORAL at 06:11

## 2025-02-16 RX ADMIN — CEPHALEXIN 500 MG: 500 CAPSULE ORAL at 00:11

## 2025-02-16 ASSESSMENT — PAIN SCALES - GENERAL: PAINLEVEL_OUTOF10: 0

## 2025-02-16 ASSESSMENT — PAIN - FUNCTIONAL ASSESSMENT: PAIN_FUNCTIONAL_ASSESSMENT: 0-10

## 2025-02-16 NOTE — ED NOTES
Patient denied any SI/HI at time of discharge. Ride placed for patient to address on chart per patient request.      Leslee Lara RN  02/16/25 9718

## 2025-02-16 NOTE — DISCHARGE INSTRUCTIONS
Please return with any homicidal or suicidal thoughts.    Please take the entire antibiotic prescription to treat the possible urinary tract infection.    Please follow-up with a primary care doctor for reevaluation.    To help you get a primary care doctor for follow-up after your emergency department visit, please call [phone] between 7AM - 6PM Monday to Friday.  A care navigator will be able to assist you with setting up a doctor close to your home.          Follow up with [facility], for substance abuse help.  [facility] [place]   [phone]   They have multiple resources to help you.  Please call.  [email or web address]     Other local resources that are available are:       [facility]    [phone]  [facility] for inpatient and outpatient substance abuse issues.    [facility] [phone]  Medication assisted treatment    [facility]  [phone]     Suicide Hotline   9-553-BHRRWXE     Narcotics Anonymous   www.na.org  Alcoholics Anonymous  www.aa.org

## 2025-02-16 NOTE — CONSULTS
PHYSICIAN SIGNATURE    This document was electronically signed by: Gely Horton MD      CONSULT COVER PAGE    FROM: [facility], [phone]  Call Back Number: [phone]  SUBJECT: Consult Recommendations  Date and Time of Report: 2025 12:00 AM ET  Items Contained in this Document: Behavioral Health Consult Note    CONSULT INFORMATION    Member Facility: [facility] MRN: [id number]  Consult ID: [id number]  Facility Time Zone: ET  Date and Time of Request: 02- 08:35 PM  ET  Requesting Clinician: Dr. Funk  Patient Name: Memo Mason  YOB: 1960  Gender: Male  Patient identity was confirmed at the beginning of the consult with the patient/family/staff using two personal identifiers: Patient name and     REASON FOR CONSULT    Reason for Consult: Initial Evaluation    CLINICAL CONCLUSION    Overall Impression:  Patient is 65 y/o  male with HX of cocaine dependence , who apparently he was found him kneeling in the driveway with a knife in his hand  and telling them he was going to hurt someone   Patient report he cannot recall  the event  . But he has an aneurysm and at time he do thing and then don't remember .  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  Patient denies any current suicidal or homicidal ideation and does not display any signs of psychosis, poor mood regulation, or poor behavioral control  Based on the available information, recommended ongoing monitor and observation .  Reassessment in am   to determine appropriate disposition and treatment recommendation .  If possible contact boarding home  for collateral    Need [facility] consult for  alternative housing  placement  he don't want to return to the [facility] .  Patient need referral to  establish care with PCP and neurology .    Disposition Recommendation:  Continue medical treatment with psychiatric

## 2025-02-16 NOTE — ED PROVIDER NOTES
Subjective 64-year-old gentleman presents after doing cocaine.  He has slept all night.  He says he is feeling much better and he denies any homicidal thoughts.  He said \"that was just the drugs talking\".  He says he has no plans to hurt anyone in his house or anybody else.  He denies any suicidal ideation.    Objective: No morning vital signs but on my evaluation his heart rate was 86 with a respiratory rate of 14.    He is AOx4.  CTA bilaterally with regular rate and rhythm.    He does not appear to be responding to any internal stimuli.  His mental status is normal.    Assessment: 64-year-old gentleman who had done cocaine and had aggressive thoughts because of it.  He has metabolized the cocaine and is feeling better and has no suicidal thoughts.  No homicidal thoughts.    Plan: I will decertified him and discharge him home.     Jairo Quiros MD  02/16/25 0865

## 2025-02-16 NOTE — ED NOTES
Call to Troy Mace, listed in patient contacts.  Left vm to notify him that patient is here and to let his sister know; as requested by patient.     Tonya Jane RN  02/15/25 5549

## 2025-02-16 NOTE — ED NOTES
Constant Observer Yes - Name: Syd Laura Observer Oriented yes   High risk patients are in line of sight at all times Yes   Excess equipment/medical supplies not necessary for the care of the patient removed Yes   All sharp or dangerous objects are removed from room: including but not limited to belts, pens & pencils, needles, medications, cosmetics, lighters, matches, nail files, watches, necklaces, glass objects, razors, razor blades, knives, aerosol sprays, drawstring pants, shoes, cords (telephone, call bells, etc.) cleaning wipes or other cleaning items, aluminum cans, not permanently attached wall décor Yes   Telephone/cell phone removed as well as TV remote (batteries can be swallowed) Yes   Patient belongings removed and labeled at nurses station Yes   Excess linen is removed from room Yes   All plastic bags are removed from the room and replaced with paper trash bags Yes   Patient is in paper scrubs or appropriate gown and using hospital socks with rubber soles Yes   No metal, hard eating utensils or hard plates are on meal tray Yes   Remove all cleaning agents used by Environmental Services Yes   If Crucifix is hanging on a nail, remove Crucifix as well as the nail No - no Crucfix       *If any question above is answered \"No,\" documentation is required.        Vivian Guerrero RN  02/16/25 0731

## 2025-02-16 NOTE — ED NOTES
Constant Observer Yes - Name: reji Laura Observer Oriented yes   High risk patients are in line of sight at all times Yes   Excess equipment/medical supplies not necessary for the care of the patient removed Yes   All sharp or dangerous objects are removed from room: including but not limited to belts, pens & pencils, needles, medications, cosmetics, lighters, matches, nail files, watches, necklaces, glass objects, razors, razor blades, knives, aerosol sprays, drawstring pants, shoes, cords (telephone, call bells, etc.) cleaning wipes or other cleaning items, aluminum cans, not permanently attached wall décor Yes   Telephone/cell phone removed as well as TV remote (batteries can be swallowed) Yes   Patient belongings removed and labeled at nurses station Yes   Excess linen is removed from room Yes   All plastic bags are removed from the room and replaced with paper trash bags Yes   Patient is in paper scrubs or appropriate gown and using hospital socks with rubber soles Yes   No metal, hard eating utensils or hard plates are on meal tray Yes   Remove all cleaning agents used by Environmental Services Yes   If Crucifix is hanging on a nail, remove Crucifix as well as the nail Yes       *If any question above is answered \"No,\" documentation is required.       Leslee Lara RN  02/16/25 8617

## 2025-02-16 NOTE — ED NOTES
Patient resting at this time. No signs or symptoms of distress. Respirations even and unlabored. Sitter at bedside. Safety precautions in place.           Leslee Lara RN  02/16/25 0835

## 2025-02-16 NOTE — ED NOTES
Samy called and notified  of our sitter need and security notified to Monika Alan RN  02/15/25 2018

## 2025-02-16 NOTE — ED TRIAGE NOTES
Patient arrives via ems from home. Patient was kneeling down in his driveway when EMS arrived. Patient gave EMS a knife and stated he was going to kill someone in his house. Patient states he has a history of brain aneurysm. Patient fell asleep and woke up on the EMS stretcher patient stated \"where am I?\" And \"who are you\" to EMS. Patient denies any memory of the event. Patient currently denies suicidal ideations and states he no longer has homicidal ideations. Patient states he did have homicidal ideations before arriving to the ER.

## 2025-02-16 NOTE — ED PROVIDER NOTES
Emergency Department Provider Note       PCP: No primary care provider on file.   Age: 64 y.o.   Sex: male     DISPOSITION Behavioral Health Hold 02/15/2025 08:34:30 PM    ICD-10-CM    1. Homicidal ideations  R45.850       2. Urinary tract infection without hematuria, site unspecified  N39.0           Medical Decision Making     Patient is a 64-year-old male who presents with homicidal ideation.  He called EMS and they found him kneeling in his driveway with a knife in his hand.  He gave the knife to EMS and stated my daughter emergency department.  I asked him what he was going to do with a knife he says he was going to cut some meat.  Patient states when he feels that way he has been instructed to call EMS.  Patient still feels the same.  Patient denies any suicide attempt or suicidal ideation.    Differential diagnosis includes but is not limited to homicidal ideation.    Patient's physical exam is unremarkable.    Laboratory testing has been ordered.  If negative we will clear the patient for psychiatric evaluation.  Patient has been placed on involuntary hold secondary to his homicidal ideation  ED Course as of 02/15/25 9444   Sat Feb 15, 2025   2322 Dr. Horton telepsychiatrist has evaluated the patient and wishes to have the patient reevaluated in the morning after patient has had some sleep.  Patient was placed on involuntary hold. [SH]      ED Course User Index  [SH] Dony Funk MD     1 or more acute illnesses that pose a threat to life or bodily function.   Shared medical decision making was utilized in creating the patients health plan today.  I independently ordered and reviewed each unique test.                         History     Patient is a 64-year-old male who presents with homicidal ideation.  He called EMS and they found him kneeling in his driveway with a knife in his hand.  He gave the knife to EMS and stated my daughter emergency department.  I asked him what he was going to do

## 2025-02-16 NOTE — ED NOTES
Constant Observer No   Constant Observer Oriented yes   High risk patients are in line of sight at all times Yes   Excess equipment/medical supplies not necessary for the care of the patient removed Yes   All sharp or dangerous objects are removed from room: including but not limited to belts, pens & pencils, needles, medications, cosmetics, lighters, matches, nail files, watches, necklaces, glass objects, razors, razor blades, knives, aerosol sprays, drawstring pants, shoes, cords (telephone, call bells, etc.) cleaning wipes or other cleaning items, aluminum cans, not permanently attached wall décor Yes   Telephone/cell phone removed as well as TV remote (batteries can be swallowed) Yes   Patient belongings removed and labeled at nurses station Yes   Excess linen is removed from room Yes   All plastic bags are removed from the room and replaced with paper trash bags Yes   Patient is in paper scrubs or appropriate gown and using hospital socks with rubber soles Yes   No metal, hard eating utensils or hard plates are on meal tray Yes   Remove all cleaning agents used by Environmental Services Yes   If Crucifix is hanging on a nail, remove Crucifix as well as the nail Yes       *If any question above is answered \"No,\" documentation is required.        Tonya Jane RN  02/15/25 2024

## 2025-02-16 NOTE — ED NOTES
Patient mobility status  with no difficulty.     I have reviewed discharge instructions with the patient.  The patient verbalized understanding.    Patient left ED via Discharge Method: ambulatory to Home with  self .    Opportunity for questions and clarification provided.     Patient given 1 scripts.           Leslee Lara RN  02/16/25 0932

## 2025-02-16 NOTE — ED NOTES
Patient was wanded by security and patient put on paper scrubs. Patient's belongings were then secured behind the security gate.      Alex Mims RN  02/15/25 2019

## 2025-03-05 ENCOUNTER — HOSPITAL ENCOUNTER (EMERGENCY)
Age: 65
Discharge: HOME | End: 2025-03-05
Attending: EMERGENCY MEDICINE
Payer: MEDICARE

## 2025-03-05 ENCOUNTER — APPOINTMENT (OUTPATIENT)
Dept: CT IMAGING | Age: 65
End: 2025-03-05
Payer: MEDICARE

## 2025-03-05 VITALS
RESPIRATION RATE: 17 BRPM | HEIGHT: 66 IN | OXYGEN SATURATION: 95 % | HEART RATE: 90 BPM | BODY MASS INDEX: 40.18 KG/M2 | WEIGHT: 250 LBS | SYSTOLIC BLOOD PRESSURE: 152 MMHG | DIASTOLIC BLOOD PRESSURE: 99 MMHG | TEMPERATURE: 97.9 F

## 2025-03-05 DIAGNOSIS — R45.1 AGITATION: Primary | ICD-10-CM

## 2025-03-05 LAB
ALBUMIN SERPL-MCNC: 3.4 G/DL (ref 3.2–4.6)
ALBUMIN/GLOB SERPL: 0.9 (ref 1–1.9)
ALP SERPL-CCNC: 115 U/L (ref 40–129)
ALT SERPL-CCNC: 27 U/L (ref 8–55)
ANION GAP SERPL CALC-SCNC: 16 MMOL/L (ref 7–16)
AST SERPL-CCNC: 27 U/L (ref 15–37)
BASOPHILS # BLD: 0.06 K/UL (ref 0–0.2)
BASOPHILS NFR BLD: 0.8 % (ref 0–2)
BILIRUB SERPL-MCNC: <0.2 MG/DL (ref 0–1.2)
BUN SERPL-MCNC: 17 MG/DL (ref 8–23)
CALCIUM SERPL-MCNC: 9.3 MG/DL (ref 8.8–10.2)
CHLORIDE SERPL-SCNC: 105 MMOL/L (ref 98–107)
CO2 SERPL-SCNC: 21 MMOL/L (ref 20–29)
CREAT SERPL-MCNC: 1.3 MG/DL (ref 0.8–1.3)
DIFFERENTIAL METHOD BLD: ABNORMAL
EOSINOPHIL # BLD: 0.21 K/UL (ref 0–0.8)
EOSINOPHIL NFR BLD: 2.9 % (ref 0.5–7.8)
ERYTHROCYTE [DISTWIDTH] IN BLOOD BY AUTOMATED COUNT: 15.8 % (ref 11.9–14.6)
ETHANOL SERPL-MCNC: 86 MG/DL (ref 0–0.08)
GLOBULIN SER CALC-MCNC: 3.9 G/DL (ref 2.3–3.5)
GLUCOSE SERPL-MCNC: 122 MG/DL (ref 70–99)
HCT VFR BLD AUTO: 41.8 % (ref 41.1–50.3)
HGB BLD-MCNC: 14.3 G/DL (ref 13.6–17.2)
IMM GRANULOCYTES # BLD AUTO: 0.02 K/UL (ref 0–0.5)
IMM GRANULOCYTES NFR BLD AUTO: 0.3 % (ref 0–5)
LYMPHOCYTES # BLD: 2.73 K/UL (ref 0.5–4.6)
LYMPHOCYTES NFR BLD: 37.4 % (ref 13–44)
MCH RBC QN AUTO: 27.8 PG (ref 26.1–32.9)
MCHC RBC AUTO-ENTMCNC: 34.2 G/DL (ref 31.4–35)
MCV RBC AUTO: 81.3 FL (ref 82–102)
MONOCYTES # BLD: 0.55 K/UL (ref 0.1–1.3)
MONOCYTES NFR BLD: 7.5 % (ref 4–12)
NEUTS SEG # BLD: 3.72 K/UL (ref 1.7–8.2)
NEUTS SEG NFR BLD: 51.1 % (ref 43–78)
NRBC # BLD: 0 K/UL (ref 0–0.2)
PLATELET # BLD AUTO: 191 K/UL (ref 150–450)
PMV BLD AUTO: 10.2 FL (ref 9.4–12.3)
POTASSIUM SERPL-SCNC: 3.4 MMOL/L (ref 3.5–5.1)
PROT SERPL-MCNC: 7.2 G/DL (ref 6.3–8.2)
RBC # BLD AUTO: 5.14 M/UL (ref 4.23–5.6)
SODIUM SERPL-SCNC: 141 MMOL/L (ref 136–145)
WBC # BLD AUTO: 7.3 K/UL (ref 4.3–11.1)

## 2025-03-05 PROCEDURE — 85025 COMPLETE CBC W/AUTO DIFF WBC: CPT

## 2025-03-05 PROCEDURE — 70450 CT HEAD/BRAIN W/O DYE: CPT

## 2025-03-05 PROCEDURE — 82077 ASSAY SPEC XCP UR&BREATH IA: CPT

## 2025-03-05 PROCEDURE — 80053 COMPREHEN METABOLIC PANEL: CPT

## 2025-03-05 PROCEDURE — 99284 EMERGENCY DEPT VISIT MOD MDM: CPT

## 2025-03-05 ASSESSMENT — PAIN - FUNCTIONAL ASSESSMENT: PAIN_FUNCTIONAL_ASSESSMENT: NONE - DENIES PAIN

## 2025-03-05 NOTE — ED TRIAGE NOTES
Pt arrived via EMS from home via EMS c/o that his anuerysm is acting up. He looses control, and doesn't know what's going.

## 2025-03-05 NOTE — DISCHARGE INSTRUCTIONS
If you have difficulty breathing, have a headache with vomiting, or if you have any other concerning symptoms, please return to the ER immediately.

## 2025-03-24 NOTE — ED NOTES
Arranged rideshare to transport patient home. ETA 1150 PM.     Romario Monroy  12/11/24 2703    
Xray Chest 1 View- PORTABLE-Urgent